# Patient Record
Sex: MALE | URBAN - METROPOLITAN AREA
[De-identification: names, ages, dates, MRNs, and addresses within clinical notes are randomized per-mention and may not be internally consistent; named-entity substitution may affect disease eponyms.]

---

## 2018-10-09 ENCOUNTER — OFFICE VISIT (OUTPATIENT)
Dept: URBAN - METROPOLITAN AREA CLINIC 76 | Facility: CLINIC | Age: 43
End: 2018-10-09
Payer: COMMERCIAL

## 2018-10-09 DIAGNOSIS — H52.4 PRESBYOPIA: Primary | ICD-10-CM

## 2018-10-09 PROCEDURE — 92015 DETERMINE REFRACTIVE STATE: CPT | Performed by: OPTOMETRIST

## 2018-10-09 PROCEDURE — 92004 COMPRE OPH EXAM NEW PT 1/>: CPT | Performed by: OPTOMETRIST

## 2018-10-09 ASSESSMENT — KERATOMETRY
OD: 45.00
OS: 44.88

## 2018-10-09 ASSESSMENT — INTRAOCULAR PRESSURE
OS: 16
OD: 16

## 2018-10-09 ASSESSMENT — VISUAL ACUITY
OS: 20/20
OD: 20/20

## 2018-10-09 NOTE — IMPRESSION/PLAN
Impression: Presbyopia: H52.4. Plan: Discussed diagnosis with patient. Dispensed new MRx today. Pt prefers to take gls off to read.

## 2020-09-15 ENCOUNTER — OFFICE VISIT (OUTPATIENT)
Dept: URBAN - METROPOLITAN AREA CLINIC 76 | Facility: CLINIC | Age: 45
End: 2020-09-15
Payer: COMMERCIAL

## 2020-09-15 DIAGNOSIS — S05.00XA CORNEAL ABRASION WITHOUT FB OF EYE, INITIAL ENCOUNTER: Primary | ICD-10-CM

## 2020-09-15 PROCEDURE — 99213 OFFICE O/P EST LOW 20 MIN: CPT | Performed by: OPTOMETRIST

## 2020-09-15 RX ORDER — OFLOXACIN 3 MG/ML
0.3 % SOLUTION/ DROPS OPHTHALMIC
Qty: 10 | Refills: 0 | Status: ACTIVE
Start: 2020-09-15

## 2020-09-15 ASSESSMENT — INTRAOCULAR PRESSURE
OD: 18
OS: 18

## 2020-09-15 NOTE — IMPRESSION/PLAN
Impression: Corneal abrasion without FB of eye, initial encounter: S05.00XA. Right. Plan: Discussed diagnosis in detail with patient. Start Ocuflox TID-QID OD along with AT's every hour while awake. Pt to call if symptoms worsen.

## 2020-09-22 ENCOUNTER — OFFICE VISIT (OUTPATIENT)
Dept: URBAN - METROPOLITAN AREA CLINIC 76 | Facility: CLINIC | Age: 45
End: 2020-09-22
Payer: COMMERCIAL

## 2020-09-22 DIAGNOSIS — S05.00XD CORNEAL ABRASION W/O FB OF EYE, SUBSEQUENT ENCOUNTER: Primary | ICD-10-CM

## 2020-09-22 DIAGNOSIS — H10.45 OTHER CHRONIC ALLERGIC CONJUNCTIVITIS: ICD-10-CM

## 2020-09-22 PROCEDURE — 99213 OFFICE O/P EST LOW 20 MIN: CPT | Performed by: OPTOMETRIST

## 2020-09-22 ASSESSMENT — INTRAOCULAR PRESSURE
OS: 15
OD: 16

## 2020-09-22 NOTE — IMPRESSION/PLAN
Impression: Corneal abrasion w/o FB of eye, subsequent encounter: S05.00XD. Right. healed Plan: Discussed with patient. Continue Ocuflox TID OD until it runs out. Continue AT's 3-4 times a day.